# Patient Record
Sex: FEMALE | Race: BLACK OR AFRICAN AMERICAN | NOT HISPANIC OR LATINO | Employment: FULL TIME | URBAN - METROPOLITAN AREA
[De-identification: names, ages, dates, MRNs, and addresses within clinical notes are randomized per-mention and may not be internally consistent; named-entity substitution may affect disease eponyms.]

---

## 2023-05-13 ENCOUNTER — OFFICE VISIT (OUTPATIENT)
Dept: URGENT CARE | Facility: URGENT CARE | Age: 33
End: 2023-05-13
Payer: COMMERCIAL

## 2023-05-13 VITALS
TEMPERATURE: 99.2 F | HEART RATE: 85 BPM | OXYGEN SATURATION: 100 % | HEIGHT: 65 IN | WEIGHT: 127 LBS | BODY MASS INDEX: 21.16 KG/M2 | SYSTOLIC BLOOD PRESSURE: 121 MMHG | DIASTOLIC BLOOD PRESSURE: 80 MMHG

## 2023-05-13 DIAGNOSIS — T19.2XXA RETAINED TAMPON NOT FOUND ON EXAMINATION: Primary | ICD-10-CM
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
## 2023-05-13 PROCEDURE — 99202 OFFICE O/P NEW SF 15 MIN: CPT | Performed by: PHYSICIAN ASSISTANT

## 2023-05-13 RX ORDER — CHLORAL HYDRATE 500 MG
1000 CAPSULE ORAL
COMMUNITY
Start: 2022-11-21

## 2023-05-13 RX ORDER — CARBOXYMETHYLCELLULOSE SODIUM 5 MG/ML
1 SOLUTION/ DROPS OPHTHALMIC 4 TIMES DAILY
COMMUNITY
Start: 2022-12-29

## 2023-05-13 RX ORDER — ETHYL ALCOHOL 700 MG/ML
1 GEL TOPICAL
COMMUNITY
Start: 2022-12-29

## 2023-05-13 RX ORDER — DOXYCYCLINE 100 MG/1
CAPSULE ORAL
COMMUNITY
Start: 2022-04-25

## 2023-05-13 RX ORDER — HYDROXYZINE HYDROCHLORIDE 25 MG/1
TABLET, FILM COATED ORAL
COMMUNITY
Start: 2023-04-10

## 2023-05-13 RX ORDER — QUETIAPINE FUMARATE 25 MG/1
TABLET, FILM COATED ORAL
COMMUNITY
Start: 2023-05-01

## 2023-05-13 RX ORDER — ISOTRETINOIN 40 MG/1
1 CAPSULE ORAL DAILY
COMMUNITY
Start: 2023-05-01

## 2023-05-13 RX ORDER — ACETAMINOPHEN 325 MG/1
650 TABLET ORAL
COMMUNITY
Start: 2022-03-10

## 2023-05-13 RX ORDER — NORELGESTROMIN AND ETHINYL ESTRADIOL 150; 35 UG/D; UG/D
PATCH TRANSDERMAL
COMMUNITY
Start: 2022-05-10

## 2023-05-13 RX ORDER — GABAPENTIN 100 MG/1
100-200 CAPSULE ORAL
COMMUNITY
Start: 2022-05-16

## 2023-05-13 RX ORDER — B-COMPLEX WITH VITAMIN C
1 TABLET ORAL 2 TIMES DAILY
COMMUNITY
Start: 2022-03-10

## 2023-05-13 RX ORDER — BUPROPION HYDROCHLORIDE 300 MG/1
300 TABLET ORAL
COMMUNITY
Start: 2023-03-29

## 2023-05-13 RX ORDER — NEOMYCIN SULFATE, POLYMYXIN B SULFATE, HYDROCORTISONE 3.5; 10000; 1 MG/ML; [USP'U]/ML; MG/ML
SOLUTION/ DROPS AURICULAR (OTIC)
COMMUNITY
Start: 2022-06-07

## 2023-05-13 NOTE — PROGRESS NOTES
Removed partial yesterday    Healthy mucosa with no erythema, discharge, blood, FB    SUBJECTIVE:   Lashanda Funez is a 32 year old female who  presents today for a possible retained tampon.  Removed partial tampon yesterday, but seems up to 1/4 of the tampon (string side) may remain in vaginal vault.  Denies pain, discharge, fever.       No past medical history on file.  Current Outpatient Medications   Medication Sig Dispense Refill     acetaminophen (TYLENOL) 325 MG tablet Take 650 mg by mouth       buPROPion (WELLBUTRIN XL) 300 MG 24 hr tablet Take 300 mg by mouth       Calcium Carbonate-Vitamin D (OYSTER SHELL CALCIUM/D) 500-5 MG-MCG TABS Take 1 tablet by mouth 2 times daily       carboxymethylcellulose (REFRESH PLUS) 0.5 % SOLN ophthalmic solution Place 1 drop into both eyes 4 times daily       doxycycline hyclate (VIBRAMYCIN) 100 MG capsule        fish oil-omega-3 fatty acids 1000 MG capsule Take 1,000 mg by mouth       gabapentin (NEURONTIN) 100 MG capsule Take 100-200 mg by mouth       hydrOXYzine (ATARAX) 25 MG tablet Take 1-2 tablets (25 -50 mg) by mouth every 6 hours as needed for Anxiety.       ISOtretinoin (ACCUTANE) 40 MG capsule Take 1 capsule by mouth daily       neomycin-polymyxin-HC 1 % SMARTSIG:Right Ear       norelgestromin-ethinyl estradiol (XULANE) 150-35 MCG/24HR patch APPLY 1 PATCH TOPICALLY TO THE SKIN EVERY WEEK       omeprazole (PRILOSEC) 20 MG DR capsule Take 20 mg by mouth       QUEtiapine (SEROQUEL) 25 MG tablet Take 1 tablet (25 mg) by mouth daily at bedtime for 4 days, then take 2 tablets (50 mg) daily at bedtime thereafter.       White Petrolatum-Mineral Oil (EYE LUBRICANT) OINT 1 Units       Social History     Tobacco Use     Smoking status: Every Day     Types: Cigarettes     Smokeless tobacco: Never   Vaping Use     Vaping status: Not on file   Substance Use Topics     Alcohol use: Not on file       ROS:   Review of systems negative except as stated  "above.    OBJECTIVE:  /80   Pulse 85   Temp 99.2  F (37.3  C) (Temporal)   Ht 1.638 m (5' 4.5\")   Wt 57.6 kg (127 lb)   LMP 05/13/2023   SpO2 100%   BMI 21.46 kg/m    GENERAL APPEARANCE: healthy, alert and no distress  GU_female: external genitalia normal, vagina normal without abnormal appearing discharge, cervix normal in appearance , no fb appreciated  SKIN: no suspicious lesions or rashes  NEURO: Normal strength and tone, sensory exam grossly normal,  normal speech and mentation    ASSESSMENT:   (T19.2XXA) Retained tampon not found on examination  (primary encounter diagnosis)  Comment: pelvic exam in company of Rachna Booth MA.   Plan: Follow up with any new symptoms    Red flags and emergent follow up discussed, and understood by patient  Follow up with PCP if symptoms worsen or fail to improve    "